# Patient Record
Sex: MALE | Race: OTHER | NOT HISPANIC OR LATINO
[De-identification: names, ages, dates, MRNs, and addresses within clinical notes are randomized per-mention and may not be internally consistent; named-entity substitution may affect disease eponyms.]

---

## 2022-07-06 PROBLEM — Z00.00 ENCOUNTER FOR PREVENTIVE HEALTH EXAMINATION: Status: ACTIVE | Noted: 2022-07-06

## 2022-07-18 ENCOUNTER — APPOINTMENT (OUTPATIENT)
Dept: PULMONOLOGY | Facility: CLINIC | Age: 67
End: 2022-07-18

## 2022-08-16 ENCOUNTER — APPOINTMENT (OUTPATIENT)
Dept: PULMONOLOGY | Facility: CLINIC | Age: 67
End: 2022-08-16

## 2022-08-16 VITALS
WEIGHT: 260 LBS | SYSTOLIC BLOOD PRESSURE: 144 MMHG | OXYGEN SATURATION: 95 % | HEART RATE: 68 BPM | RESPIRATION RATE: 14 BRPM | BODY MASS INDEX: 39.4 KG/M2 | DIASTOLIC BLOOD PRESSURE: 84 MMHG | HEIGHT: 68 IN

## 2022-08-16 PROCEDURE — 99213 OFFICE O/P EST LOW 20 MIN: CPT

## 2022-08-16 NOTE — REASON FOR VISIT
[Follow-Up] : a follow-up visit [Sleep Apnea] : sleep apnea [Obesity] : obesity [TextBox_44] : Patient has a history of obstructive sleep apnea syndrome.  He is using his machine and is very compliant.  He feels he benefits greatly from the use of the equipment.  He states that he does not think he has missed a night in years.  His machine is over 8 years old.  He is looking to get a new machine as he is having difficulty finding supplies for the old one.

## 2022-11-07 ENCOUNTER — APPOINTMENT (OUTPATIENT)
Dept: PULMONOLOGY | Facility: CLINIC | Age: 67
End: 2022-11-07

## 2022-11-07 VITALS
OXYGEN SATURATION: 95 % | WEIGHT: 260 LBS | HEART RATE: 67 BPM | DIASTOLIC BLOOD PRESSURE: 84 MMHG | HEIGHT: 68 IN | SYSTOLIC BLOOD PRESSURE: 138 MMHG | BODY MASS INDEX: 39.4 KG/M2

## 2022-11-07 DIAGNOSIS — G47.33 OBSTRUCTIVE SLEEP APNEA (ADULT) (PEDIATRIC): ICD-10-CM

## 2022-11-07 DIAGNOSIS — E66.9 OBESITY, UNSPECIFIED: ICD-10-CM

## 2022-11-07 PROCEDURE — 99213 OFFICE O/P EST LOW 20 MIN: CPT

## 2022-11-07 NOTE — REASON FOR VISIT
[Follow-Up] : a follow-up visit [Sleep Apnea] : sleep apnea [TextBox_44] : History of sleep apnea doing well.  Just received his new machine several months ago.  He is doing very well.  Very compliant

## 2024-09-27 ENCOUNTER — DOCTOR'S OFFICE (OUTPATIENT)
Dept: URBAN - METROPOLITAN AREA CLINIC 166 | Facility: CLINIC | Age: 69
Setting detail: OPHTHALMOLOGY
End: 2024-09-27
Payer: MEDICARE

## 2024-09-27 DIAGNOSIS — D31.31: ICD-10-CM

## 2024-09-27 DIAGNOSIS — H25.13: ICD-10-CM

## 2024-09-27 DIAGNOSIS — H25.013: ICD-10-CM

## 2024-09-27 PROBLEM — E11.9 TYPE 2 DIABETES MELLITUS WITHOUT COMPLICATIONS: Status: ACTIVE | Noted: 2024-09-27

## 2024-09-27 PROCEDURE — 92004 COMPRE OPH EXAM NEW PT 1/>: CPT | Performed by: OPHTHALMOLOGY

## 2024-09-27 PROCEDURE — 92015 DETERMINE REFRACTIVE STATE: CPT | Performed by: OPHTHALMOLOGY

## 2024-09-27 ASSESSMENT — REFRACTION_MANIFEST
OD_SPHERE: -3.00
OS_SPHERE: -0.25
OS_ADD: +2.50
OD_CYLINDER: +1.50
OS_VA1: 20/20
OD_VA1: 20/60
OD_ADD: +2.50
OD_AXIS: 090
OS_CYLINDER: SPH
OD_VA2: 20/20(J1+)
OU_VA: 20/20
OS_VA2: 20/BINOC

## 2024-09-27 ASSESSMENT — CONFRONTATIONAL VISUAL FIELD TEST (CVF)
OS_FINDINGS: FULL
OD_FINDINGS: FULL

## 2024-09-27 ASSESSMENT — REFRACTION_CURRENTRX
OS_OVR_VA: 20/
OS_ADD: +2.25
OD_OVR_VA: 20/
OS_SPHERE: -0.50
OD_CYLINDER: +1.50
OD_VPRISM_DIRECTION: PROGS
OD_SPHERE: -3.00
OS_CYLINDER: +0.50
OS_VPRISM_DIRECTION: PROGS
OD_AXIS: 092
OS_AXIS: 116
OD_ADD: +2.25

## 2024-09-27 ASSESSMENT — KERATOMETRY
OD_K1POWER_DIOPTERS: 45.55
OS_K2POWER_DIOPTERS: 46.00
OS_AXISANGLE_DEGREES: 052
OS_K1POWER_DIOPTERS: 45.75
OD_K2POWER_DIOPTERS: 847.5
OD_AXISANGLE_DEGREES: 098

## 2024-09-27 ASSESSMENT — REFRACTION_AUTOREFRACTION
OS_SPHERE: +0.25
OS_CYLINDER: +0.50
OD_CYLINDER: +1.75
OS_AXIS: 152
OD_AXIS: 108
OD_SPHERE: -2.50

## 2024-09-27 ASSESSMENT — TONOMETRY
OD_IOP_MMHG: 15
OS_IOP_MMHG: 17

## 2024-09-27 ASSESSMENT — VISUAL ACUITY
OD_BCVA: 20/20
OS_BCVA: 20/60